# Patient Record
Sex: MALE | Race: BLACK OR AFRICAN AMERICAN | NOT HISPANIC OR LATINO | Employment: FULL TIME | URBAN - METROPOLITAN AREA
[De-identification: names, ages, dates, MRNs, and addresses within clinical notes are randomized per-mention and may not be internally consistent; named-entity substitution may affect disease eponyms.]

---

## 2017-02-01 ENCOUNTER — TRANSCRIBE ORDERS (OUTPATIENT)
Dept: URGENT CARE | Facility: CLINIC | Age: 37
End: 2017-02-01

## 2017-02-01 DIAGNOSIS — S39.012A LOW BACK STRAIN, INITIAL ENCOUNTER: Primary | ICD-10-CM

## 2017-02-03 ENCOUNTER — TREATMENT (OUTPATIENT)
Dept: PHYSICAL THERAPY | Facility: CLINIC | Age: 37
End: 2017-02-03

## 2017-02-03 DIAGNOSIS — S39.012D LUMBAR STRAIN, SUBSEQUENT ENCOUNTER: Primary | ICD-10-CM

## 2017-02-03 PROCEDURE — 97110 THERAPEUTIC EXERCISES: CPT | Performed by: PHYSICAL THERAPIST

## 2017-02-03 PROCEDURE — 97001 PR PHYS THERAPY EVALUATION: CPT | Performed by: PHYSICAL THERAPIST

## 2017-02-03 PROCEDURE — 97014 ELECTRIC STIMULATION THERAPY: CPT | Performed by: PHYSICAL THERAPIST

## 2017-02-03 NOTE — PROGRESS NOTES
Physical Therapy Initial Evaluation and Plan of Care    TIME  TIME     Subjective Evaluation    History of Present Illness  Date of onset: 2017  Mechanism of injury: Pulling a bertin down a ramp    Quality of life: excellent    Pain  Current pain ratin  At worst pain ratin  Location: left low back  Quality: burning  Relieving factors: rest  Aggravating factors: movement  Progression: improved    Patient Goals  Patient goal: no pain           Objective     Postural Observations    Additional Postural Observation Details  Normal transitions    Tenderness     Additional Tenderness Details  TTP to left T12-L3 pvms    Active Range of Motion     Lumbar   Flexion: 96 degrees   Extension: 17 degrees   Left lateral flexion: 16 degrees   Right lateral flexion: 20 degrees     Tests     Lumbar     Left   Negative passive SLR.     Right   Negative passive SLR.     Additional Tests Details  + WESLEY on left for LBP, + Prone Press Up         Assessment & Plan     Assessment  Impairments: abnormal or restricted ROM, activity intolerance, lacks appropriate home exercise program and pain with function  Assessment details: Patient presents with pain, TTP, decreased AROM for lumbar spine and positive special testing which are limiting patient's ability to perform full job duties and ADL'S.  Prognosis details: STG's to be met by 2 weeks  1)  Independent with HEP  2)  Decrease pain by 50% or more  3)  AROM WNL for lumbar spine  4)  Min to no TTP to lumbar spine    LTG's to be met by 4 weeks  1)  Independent with HEP progression  2)  Decrease pain by 75% or more  3)  No TTP to lumbar spine  4)  Negative special testing  5)  Patient to push/pull up to 100 lbs  6)  Patient to lift floor to waist up to 50 lbs    Goals  no pain    Plan  Therapy options: will be seen for skilled physical therapy services  Planned modality interventions: TENS, ultrasound and iontophoresis  Planned therapy interventions: home exercise  program, stretching, strengthening, therapeutic activities, abdominal trunk stabilization, body mechanics training and flexibility  Frequency: 3x week  Duration in weeks: 4        Manual Therapy:    0     mins  96538;  Therapeutic Exercise:    10     mins  83085;     Neuromuscular Kristen:    0    mins  51194;    Therapeutic Activity:     0     mins  50694;     Gait Trainin     mins  97743;     Ultrasound:     0     mins  51450;    Work Hardening           0      mins 71473  Iontophoresis               0   mins 45510    Timed Treatment:   10   mins   Total Treatment:     25   mins    PT SIGNATURE: Antonio Rea, PT   DATE TREATMENT INITIATED: 2/3/2017    Initial Certification  Certification Period: 2017  I certify that the therapy services are furnished while this patient is under my care.  The services outlined above are required by this patient, and will be reviewed every 90 days.     PHYSICIAN: SREEDHAR Perla      DATE:     Please sign and return via fax to [phone].. Thank you, [facility].

## 2017-02-06 ENCOUNTER — TREATMENT (OUTPATIENT)
Dept: PHYSICAL THERAPY | Facility: CLINIC | Age: 37
End: 2017-02-06

## 2017-02-06 DIAGNOSIS — S39.012D LUMBAR STRAIN, SUBSEQUENT ENCOUNTER: Primary | ICD-10-CM

## 2017-02-06 PROCEDURE — 97110 THERAPEUTIC EXERCISES: CPT | Performed by: PHYSICAL THERAPIST

## 2017-02-06 PROCEDURE — 97014 ELECTRIC STIMULATION THERAPY: CPT | Performed by: PHYSICAL THERAPIST

## 2017-02-06 NOTE — PROGRESS NOTES
Physical Therapy Daily Progress Note    Time In 126  Time Out 207    Emile Bloom reports: that the back feels all right, rates the pain at 4/10.    Subjective     Objective   See Exercise, Manual, and Modality Logs for complete treatment.       Assessment/Plan  Patient tolerated the initiation of core stabilization exercises without c/o pain or discomfort in the lumbar spine.                   Manual Therapy:    0     mins  38325;  Therapeutic Exercise:    23     mins  88524;     Neuromuscular Kristen:    0    mins  92923;    Therapeutic Activity:     0     mins  14287;     Gait Trainin     mins  17021;     Ultrasound:     0     mins  24535;    Work Hardening           0      mins 49138  Iontophoresis               0   mins 52103    Timed Treatment:   23   mins   Total Treatment:     41   mins    Antonio Rea, PT  Physical Therapist

## 2017-02-08 ENCOUNTER — TRANSCRIBE ORDERS (OUTPATIENT)
Dept: URGENT CARE | Facility: CLINIC | Age: 37
End: 2017-02-08

## 2017-02-08 ENCOUNTER — TREATMENT (OUTPATIENT)
Dept: PHYSICAL THERAPY | Facility: CLINIC | Age: 37
End: 2017-02-08

## 2017-02-08 DIAGNOSIS — S39.012D LUMBAR STRAIN, SUBSEQUENT ENCOUNTER: Primary | ICD-10-CM

## 2017-02-08 DIAGNOSIS — S39.012A LOW BACK STRAIN, INITIAL ENCOUNTER: Primary | ICD-10-CM

## 2017-02-08 PROCEDURE — 97014 ELECTRIC STIMULATION THERAPY: CPT | Performed by: PHYSICAL THERAPIST

## 2017-02-08 PROCEDURE — 97110 THERAPEUTIC EXERCISES: CPT | Performed by: PHYSICAL THERAPIST

## 2017-02-08 NOTE — PROGRESS NOTES
Re-Assessment / Re-Certification    Time In 133     Time Out 217    Patient: Emile Bloom   : 1980  Diagnosis/ICD-10 Code:  Lumbar strain, subsequent encounter [S39.012D]  Referring practitioner: SREEDHAR Perla  Date of Initial Visit: 2017  Today's Date: 2017  Patient seen for 3 sessions      Subjective:   Emile Bloom reports: that the back feels about the same, rates the pain at 4/10.    Clinical Progress: unchanged  Home Program Compliance: Yes  Treatment has included: therapeutic exercise, electrical stimulation and moist heat    Subjective Evaluation    Pain  Current pain ratin         Objective     Tenderness     Additional Tenderness Details  TTP to left lumbar pvms and QL.    Active Range of Motion     Lumbar   Flexion: 109 degrees   Extension: 23 degrees   Left lateral flexion: 22 degrees   Right lateral flexion: 19 degrees     Tests     Lumbar     Left   Negative passive SLR.     Right   Negative passive SLR.     Additional Tests Details  - Prone Press Up  + WESLEY on left for LBP     Assessment/Plan  Patient has tolerated PT well thus far.  Minimal improvements have been made.  Deficits persist with regard to pain, TTP, SB AROM, special testing and function.      PT Signature: Antonio Rea, PT      Based upon review of the patient's progress and continued therapy plan, it is my medical opinion that Emile Bloom should continue physical therapy treatment at [facility]  [address] [place] [id number].    Signature: __________________________________  SREEDHAR Perla    Manual Therapy:    0     mins  78719;  Therapeutic Exercise:    29     mins  34149;     Neuromuscular Kristen:    0    mins  72170;    Therapeutic Activity:     0     mins  35677;     Gait Trainin     mins  00855;     Ultrasound:     0     mins  05233;    Work Hardening           0      mins 29547  Iontophoresis               0   mins 67841    Timed  Treatment:   29   mins   Total Treatment:     44   mins

## 2017-02-10 ENCOUNTER — TREATMENT (OUTPATIENT)
Dept: PHYSICAL THERAPY | Facility: CLINIC | Age: 37
End: 2017-02-10

## 2017-02-10 DIAGNOSIS — S39.012D LUMBAR STRAIN, SUBSEQUENT ENCOUNTER: Primary | ICD-10-CM

## 2017-02-10 PROCEDURE — 97110 THERAPEUTIC EXERCISES: CPT | Performed by: PHYSICAL THERAPIST

## 2017-02-10 PROCEDURE — 97014 ELECTRIC STIMULATION THERAPY: CPT | Performed by: PHYSICAL THERAPIST

## 2017-02-10 NOTE — PROGRESS NOTES
Physical Therapy Daily Progress Note    Time In 120  Time Out 205    Emile Bloom reports: pain in the back rated at 3/10, better overall.    Subjective     Objective   See Exercise, Manual, and Modality Logs for complete treatment.       Assessment/Plan  Good tolerance to the core stabilization progressions, patient reported no pain in the lumbar spine with the routine.  Subjective reports continue to improve.                   Manual Therapy:    0     mins  16983;  Therapeutic Exercise:    26     mins  84634;     Neuromuscular Kristen:    0    mins  40204;    Therapeutic Activity:     0     mins  53406;     Gait Trainin     mins  33963;     Ultrasound:     0     mins  96196;    Work Hardening           0      mins 02784  Iontophoresis               0   mins 45967    Timed Treatment:   26   mins   Total Treatment:     45   mins    Antonio Rea, PT  Physical Therapist

## 2017-02-15 ENCOUNTER — TREATMENT (OUTPATIENT)
Dept: PHYSICAL THERAPY | Facility: CLINIC | Age: 37
End: 2017-02-15

## 2017-02-15 DIAGNOSIS — S39.012D LUMBAR STRAIN, SUBSEQUENT ENCOUNTER: Primary | ICD-10-CM

## 2017-02-15 PROCEDURE — 97110 THERAPEUTIC EXERCISES: CPT | Performed by: PHYSICAL THERAPIST

## 2017-02-15 PROCEDURE — 97014 ELECTRIC STIMULATION THERAPY: CPT | Performed by: PHYSICAL THERAPIST

## 2017-02-15 NOTE — PROGRESS NOTES
Physical Therapy Daily Progress Note    Time In 243  Time Out 325    Emile Bloom reports: that the back feels good, denies pain but does report some stiffness.    Subjective     Objective   See Exercise, Manual, and Modality Logs for complete treatment.       Assessment/Plan  Subjective reports continue to be much improved.  Patient performed the routine without c/o pain in the lumbar spine.  Anticipate D/C at next MD f/u.    Plan  Assess at next treatment for MD f/u.                 Manual Therapy:    0     mins  67403;  Therapeutic Exercise:    25     mins  73151;     Neuromuscular Kristen:    0    mins  01350;    Therapeutic Activity:     0     mins  89926;     Gait Trainin     mins  62804;     Ultrasound:     0     mins  24454;    Work Hardening           0      mins 11144  Iontophoresis               0   mins 34483    Timed Treatment:   25   mins   Total Treatment:     42   mins    Antonio Rea, PT  Physical Therapist

## 2017-02-17 ENCOUNTER — TREATMENT (OUTPATIENT)
Dept: PHYSICAL THERAPY | Facility: CLINIC | Age: 37
End: 2017-02-17

## 2017-02-17 DIAGNOSIS — S39.012D LUMBAR STRAIN, SUBSEQUENT ENCOUNTER: Primary | ICD-10-CM

## 2017-02-17 PROCEDURE — 97110 THERAPEUTIC EXERCISES: CPT | Performed by: PHYSICAL THERAPIST

## 2017-02-17 NOTE — PROGRESS NOTES
Re-Assessment / Re-Certification    Time In 149     Time Out 158    Patient: Emile Bloom   : 1980  Diagnosis/ICD-10 Code:  Lumbar strain, subsequent encounter [S39.012D]  Referring practitioner: SREEDHAR Perla  Date of Initial Visit: 2017  Today's Date: 2017  Patient seen for 6 sessions      Subjective:   Emile Bloom reports: that the back feels good, denies pain.    Clinical Progress: improved  Home Program Compliance: Yes  Treatment has included: therapeutic exercise, electrical stimulation and moist heat    Subjective   Objective     Tenderness     Additional Tenderness Details  No TTP to left lumbar spine as previous.    Active Range of Motion     Lumbar   Normal active range of motion    Tests     Lumbar     Left   Negative passive SLR.     Right   Negative passive SLR.     Additional Tests Details  - WESLEY  - Prone Press Up     Patient is able to lift floor to waist up to 55 lbs without c/o.  Patient is able to push/pull up to 138 lbs without c/o.    Assessment/Plan      PT Signature: Antonio Rea, PT      Based upon review of the patient's progress and continued therapy plan, it is my medical opinion that Emile Bloom should continue physical therapy treatment at [facility]  [address] [place] [phone].    Signature: __________________________________  SREEDHAR Perla    Manual Therapy:    0     mins  85603;  Therapeutic Exercise:     9    mins  18416;     Neuromuscular Kristen:    0    mins  69656;    Therapeutic Activity:     0     mins  09564;     Gait Trainin     mins  24798;     Ultrasound:     0     mins  90223;    Work Hardening           0      mins 47497  Iontophoresis               0   mins 89391    Patient late therefore only reassessed for MD appointment.    Timed Treatment:   9   mins   Total Treatment:     9   mins

## 2017-03-09 ENCOUNTER — DOCUMENTATION (OUTPATIENT)
Dept: PHYSICAL THERAPY | Facility: CLINIC | Age: 37
End: 2017-03-09

## 2017-03-09 NOTE — PROGRESS NOTES
Discharge Summary  Discharge Summary from Physical Therapy Report      Dates  PT visit: 2/3 to 2/17/17  Number of Visits: 6     Discharge Status of Patient: See MD Note dated 2/17/17    Goals: Partially Met    Discharge Plan: Continue with current home exercise program as instructed    Comments Patient released by MD.    Date of Discharge 3/9/17        Antonio Rea, PT  Physical Therapist

## 2022-10-27 ENCOUNTER — TRANSCRIBE ORDERS (OUTPATIENT)
Dept: OCCUPATIONAL THERAPY | Facility: CLINIC | Age: 42
End: 2022-10-27

## 2022-10-27 DIAGNOSIS — M72.2 PLANTAR FASCIITIS OF RIGHT FOOT: Primary | ICD-10-CM

## 2022-10-28 ENCOUNTER — TREATMENT (OUTPATIENT)
Dept: PHYSICAL THERAPY | Facility: CLINIC | Age: 42
End: 2022-10-28

## 2022-10-28 DIAGNOSIS — M72.2 PLANTAR FASCIITIS OF RIGHT FOOT: Primary | ICD-10-CM

## 2022-10-28 PROCEDURE — 97033 APP MDLTY 1+IONTPHRSIS EA 15: CPT | Performed by: PHYSICAL THERAPIST

## 2022-10-28 PROCEDURE — 97162 PT EVAL MOD COMPLEX 30 MIN: CPT | Performed by: PHYSICAL THERAPIST

## 2022-10-28 PROCEDURE — 97140 MANUAL THERAPY 1/> REGIONS: CPT | Performed by: PHYSICAL THERAPIST

## 2022-10-28 PROCEDURE — 97530 THERAPEUTIC ACTIVITIES: CPT | Performed by: PHYSICAL THERAPIST

## 2022-10-28 NOTE — PROGRESS NOTES
Physical Therapy Initial Evaluation and Plan of Care    Patient Name: Emile Bloom          :  1980  Referring Physician: Keyona Weaver APRN  Diagnosis: Plantar fasciitis of right foot [M72.2]    Date of Evaluation: 10/28/2022  ______________________________________________________________________    Subjective Evaluation    History of Present Illness  Date of onset: 10/26/2022  Mechanism of injury: Walking up /down ramp;  Felt pain while going down ramp towards the end of his work day -       Patient Occupation: Deliver alcohol Pain  Current pain ratin  At worst pain ratin  Location: Volar heel and medial arch (R)   Quality: Throbs / Ache; Hurting.  Alleviating factors: Rest - NWB.  Exacerbated by: WB-ing RLE, walking, stairs, ramps, etc.  Symptom course: Mild improvement, but minmal     Diagnostic Tests  X-ray: normal    Treatments  Current treatment: medication  Patient Goals  Patient/family treatment goals: Pain alleviation; Normal gait, endurance, mobility to allow ADLs and normal job.         ___________________________________________________  Objective          Observations     Additional Ankle/Foot Observation Details  Standing w/ Wt on ball of (R) foot w/ WS to (L)  Antalgic gait w/ no heel strike (R) and minimal WB-ing RLE  Pes cavus (B)   Swelling medial arch / medial prox calcaneus -     Tenderness     Additional Tenderness Details  Severely tender volar calcaneus/PF, medial arch (R)    Active Range of Motion     Additional Active Range of Motion Details  WFL (R) Ankle w/ pulling discomfort volar foot/medial arch w/ DF -     Strength/Myotome Testing     Additional Strength Details  WNL w/ manual resisted ankle DF/PF, INV/EV      Tests     Additional Tests Details  (-) Ankle ligamentous testing - and ant/post drawer -         See Treatment Flow sheet for Exercises, Manual therapy, and modalities. HEP and discussed frequency of stretching, icing, etc  FUNCTIONAL ACTIVITIES:    · TAPING / BRACING: K-tape to Unload medial arch w/ Leukotape over x 2 layers (R) foot   · Jt protection, ADL modification; Posture and     ___________________________________________________  Assessment & Plan     Assessment    Assessment details: 43 yo male - (R) PF strain / Plantar fasciitis     PROBLEMS: Pain; Abnormal gait; Intolerance to ADLs and normal job -   PROGNOSIS: Good    GOALS:   SHORT TERM GOALS: 2 weeks:  1) HEP Initiated; 2) Pain decreased 50%:   3) Improved gait with increased heel contact / improved functional ability with taping     LONG TERM GOALS: 4 weeks (or at time of DISCHARGE): 1) (I) HEP; 2) AROM WFL and pain free; 3) Strength / mobility to be able to perform all ADL's and job-related activities w/o restrictions; 4) Gait / endurance to allow normal ADLs and job duties -       Plan  Planned therapy interventions: flexibility, manual therapy, neuromuscular re-education, strengthening, stretching, therapeutic activities and soft tissue mobilization  Frequency: 3x week  Duration in weeks: 4  Treatment plan discussed with: patient      ___________________________________________________  Manual Therapy:    08     mins  44977;  Therapeutic Exercise:    05     mins  47372;     Neuromuscular Kristen:        mins  12135;    Therapeutic Activity:     15     mins  71251;   Self Care:                           mins  20032  Ultrasound:     08     mins  02803;  Iontophoresis:     20    mins  36711;    Electrical Stimulation:         mins  82798 ( );  Mechanical Traction:          mins  42577  Dry Needling          mins self-pay    Eval:   15   mins    Timed Treatment:   56   mins                  Total Treatment:     71   mins    PT SIGNATURE:     Cheo Gentile, PT  DATE TREATMENT INITIATED: 10/28/2022  ___________________________________________________  Initial Certification  Certification Period: 1/26/2023  I certify that the therapy services are furnished while this patient is under  my care.  The services outlined above are required by this patient, and will be reviewed every 90 days.     PHYSICIAN: ________________________________  DATE: ______  Keyona Weaver APRN        Please sign and return via fax to [phone].. Thank you, [facility].  ______________________________________________________________________  [address] [place] [address]  Phone: [phone] Fax: [phone]

## 2022-11-01 NOTE — PROGRESS NOTES
Physical Therapy Daily Treatment Note    Visit Diagnoses:    ICD-10-CM ICD-9-CM   1. Plantar fasciitis of right foot  M72.2 728.71       VISIT#: Darlin Bloom reports: His foot feels better than it did the other day. He can actually stand flat footed. Now the back of his heel is better but the arch is hurting more. Stated he got some insoles and that the tape was very helpful. Everyday it is getting a little better.   Current Pain Level:    6/10; Worst:   8/10; Best:  0/10  Location Of Pain: Volar heel and medial arch (R)  Quality of Pain: Throbs / Ache; Hurting  Response to Previous Session: good. No issues and responded well  Functional Deficits/Irritating Factors: WB-ing RLE, walking, stairs, ramps, etc  Progression: improving  Compliance with HEP Reported: yes      Objective  Presents: Antalgic gait w/ no heel strike (R) and minimal WB-ing RLE, Pes cavus (B)   Increased sets/reps of:  none   Increased resistance on:  none  Added to Program: SciFit, Heel Toe Raises,  Toe Scrunches, PF and standing calf stretches  Therapeutic Activity: TAPING / BRACING: K-tape to Unload medial arch w/ Leukotape over x 2 layers (R) foot       See Exercise, Manual, and Modality Logs for complete treatment.     Patient Education: Pt was educated on exercise biomechanical correctness, intensity, and speed.     Assessment:  Pt making progress with decreased pain and improved gait and tolerance to wt bearing activities. Pt will continue to benefit from skilled PT interventions to address current functional deficits and impairments.       Plan: Progress to/Continue with current program. Progress as pain diminishes      Timed:  Manual Therapy:            10_    mins  87116;  Ultrasound:                     0/10      mins  93248;   Therapeutic Exercise:    20/25     mins  47282;     Neuromuscular Kristen:   0     mins  82612;    Therapeutic Activity:      8     mins  47145;      Iontophoresis              _20__   mins  Dry Needling                _0____   mins         Untimed:  Work Conditioning: __0__ mins 13266  Electrical Stimulation:    0    mins  73354 ( );  Mechanical Traction:       0        mins  83065;   Paraffin                       __0___  mins   Ice/Heat: __0__ mins      Timed Treatment:   73   mins   Total Treatment:     80   mins          Jaye Charlton PTA  [place] License # [id number]  Physical Therapist Assistant

## 2022-11-02 ENCOUNTER — TREATMENT (OUTPATIENT)
Dept: PHYSICAL THERAPY | Facility: CLINIC | Age: 42
End: 2022-11-02

## 2022-11-02 DIAGNOSIS — M72.2 PLANTAR FASCIITIS OF RIGHT FOOT: Primary | ICD-10-CM

## 2022-11-02 PROCEDURE — 97033 APP MDLTY 1+IONTPHRSIS EA 15: CPT | Performed by: PHYSICAL THERAPIST

## 2022-11-02 PROCEDURE — 97530 THERAPEUTIC ACTIVITIES: CPT | Performed by: PHYSICAL THERAPIST

## 2022-11-02 PROCEDURE — 97110 THERAPEUTIC EXERCISES: CPT | Performed by: PHYSICAL THERAPIST

## 2022-11-02 PROCEDURE — 97140 MANUAL THERAPY 1/> REGIONS: CPT | Performed by: PHYSICAL THERAPIST

## 2022-11-03 ENCOUNTER — TREATMENT (OUTPATIENT)
Dept: PHYSICAL THERAPY | Facility: CLINIC | Age: 42
End: 2022-11-03

## 2022-11-03 DIAGNOSIS — M72.2 PLANTAR FASCIITIS OF RIGHT FOOT: Primary | ICD-10-CM

## 2022-11-03 PROCEDURE — 97110 THERAPEUTIC EXERCISES: CPT | Performed by: PHYSICAL THERAPIST

## 2022-11-03 PROCEDURE — 97140 MANUAL THERAPY 1/> REGIONS: CPT | Performed by: PHYSICAL THERAPIST

## 2022-11-03 PROCEDURE — 97530 THERAPEUTIC ACTIVITIES: CPT | Performed by: PHYSICAL THERAPIST

## 2022-11-03 PROCEDURE — 97033 APP MDLTY 1+IONTPHRSIS EA 15: CPT | Performed by: PHYSICAL THERAPIST

## 2022-11-03 NOTE — PROGRESS NOTES
------------------------------------------------------------------------------------------------------   MD PROGRESS NOTE    Patient: Emile Bloom        : 1980  Diagnosis/ICD-10 Code:  Plantar fasciitis of right foot [M72.2]  Referring practitioner: SHALONDA Winters  Date of Initial Visit: 10/28/2022                  Today's Date: 11/3/2022  _________________________________________________________________    Thank you for the referral of Mr. Bloom to [facility].  Mr. Bloom has attended 3 PT sessions and their treatment has consisted of: modalities prn, manual therapy, therapeutic exercise, taping, patient education, and HEP.     Subjective   Emile Bloom reports: taping helpful in decreasing his pain ; 40% - Still very painful to heel strike especially bare foot -   Pain volar heel and prox/medial arch (R) foot -   Tape helps a lot - able to walk better -  ___________________________________________________________________  Objective              OBSERVATION: Improved, but antalgic gait RLE with minimal heel strike- Swelling volar/med heel/arch (R)              PALPATION: Tender prox PF and med/volar calc and med arch (R)        AROM: WFNL   STRENGTH: WNL ankle   DTR's/SENSATION: Grossly intact LEs   SPECIAL TESTS: (-) ankle tests - (+) Plantar fasciitis (R)   ACTIVITY TOLERANCE: Improved, but limited tolerance to ADLs and normal job      See Exercise, Manual, and Modality Logs for complete treatment.      Functional / Therapeutic Activities:    · TAPING / BRACING: K-tape to Unload medial arch w/ Leukotape over x 2 layers (R) foot   · FUNCTIONAL ASSESSMENT  · SEE EXERCISE FLOW SHEET -   · Jt protection, ADL modification; Posture and    ___________________________________________________________________   Assessment/Plan  41 yo male - (R) PF strain / Plantar fasciitis   Improved overall - taping helpful   Mr. Bloom would benefit from continued Physical Therapy.      P: Recommend continued Physical Therapy to allow a full and safe return to ADL's and normal job duties.    Please advise after your exam.    Thank you again for this referral of Mr. Bloom to [facility].    PT Signature: ______________________________   Cheo Gentile, PT  ____________________________________________________________________  Manual Therapy:    08     mins  80985;  Therapeutic Exercise:    15     mins  49725;     Neuromuscular Kristen:        mins  25972;    Therapeutic Activity:     23     mins  11077;   Self Care:                           mins  84717  Ultrasound:     06     mins  58733;  Iontophoresis:     20     mins  30731;    Electrical Stimulation:         mins  50997 ( );    Timed Treatment:   72   mins                  Total Treatment:     72   mins    ______________________________________________________________________  [address] [place] [address]  Phone: [phone] Fax: [phone]

## 2022-11-06 NOTE — PROGRESS NOTES
Physical Therapy Daily Treatment Note    Visit Diagnoses:    ICD-10-CM ICD-9-CM   1. Plantar fasciitis of right foot  M72.2 728.71       VISIT#: 4      Emile Bloom reports: His foot is getting better. He can walk better on it.   Current Pain Level:    0/10; Worst:   4-5/10; Best:  0/10  Location Of Pain: Volar heel and medial arch (R)  Quality of Pain: Throbs / Ache; Hurting  Response to Previous Session: good. No issues and responded well  Functional Deficits/Irritating Factors: WB-ing RLE, walking, stairs, ramps, etc  Progression: improving  Compliance with HEP Reported: yes    Objective  Presents: antalgic gait RLE with minimal heel strike- Swelling volar/med heel/arch (R), bruising on bottom of foot  Increased sets/reps of:  none   Increased resistance on:  none  Added to Program: none  Pt was 20 min late for his appointment    Therapeutic Activity: TAPING /BRACING: K-tape to Unload medial arch w/ Leukotape over x 2 layers (R) foot       See Exercise, Manual, and Modality Logs for complete treatment.     Patient Education: Pt was educated on exercise biomechanical correctness, intensity, and speed.     Assessment:  Pt foot continues to improved functionally with increased walking time and distance. Pain has also decreased and less tenderness in the plantar fascia. Mild edematous knot still on medial arch.  Pt will continue to benefit from skilled PT interventions to address current functional deficits and impairments.       Plan: Progress to/Continue with current program.       Timed:  Manual Therapy:            8_    mins  51902;  Ultrasound:                     8      mins  80342;   Therapeutic Exercise:    20     mins  11498;     Neuromuscular Kristen:   0     mins  29638;    Therapeutic Activity:      0     mins  59793;      Iontophoresis              _20__   mins  Dry Needling               _0____   mins         Untimed:  Work Conditioning: __0__ mins 25052  Electrical Stimulation:    0    mins  29806 (LALO  );  Mechanical Traction:       0        mins  38677;   Paraffin                       __0___  mins   Ice/Heat: __0__ mins      Timed Treatment:   56   mins   Total Treatment:     56   mins          ANGELIQUE Jaimes License # [id number]  Physical Therapist Assistant

## 2022-11-07 ENCOUNTER — TREATMENT (OUTPATIENT)
Dept: PHYSICAL THERAPY | Facility: CLINIC | Age: 42
End: 2022-11-07

## 2022-11-07 DIAGNOSIS — M72.2 PLANTAR FASCIITIS OF RIGHT FOOT: Primary | ICD-10-CM

## 2022-11-07 PROCEDURE — 97035 APP MDLTY 1+ULTRASOUND EA 15: CPT | Performed by: PHYSICAL THERAPIST

## 2022-11-07 PROCEDURE — 97033 APP MDLTY 1+IONTPHRSIS EA 15: CPT | Performed by: PHYSICAL THERAPIST

## 2022-11-07 PROCEDURE — 97110 THERAPEUTIC EXERCISES: CPT | Performed by: PHYSICAL THERAPIST

## 2022-11-07 PROCEDURE — 97140 MANUAL THERAPY 1/> REGIONS: CPT | Performed by: PHYSICAL THERAPIST

## 2022-11-08 NOTE — PROGRESS NOTES
MD Letter  Patient: Emile Bloom   : 1980    Date of Initial Visit: Type: THERAPY  Noted: 10/28/2022  Visit Diagnoses:     ICD-10-CM ICD-9-CM   1. Plantar fasciitis of right foot  M72.2 728.71     Today's Date: 2022  Patient seen for 5 sessions    Treatment has included: therapeutic exercise, manual therapy, ultrasound, iontophoresis and Taping    Subjective Pt reports a 65% improvement since starting PT. He is still having pain with walking. Ramps and stairs still bother his foot. It is getting better daily.   Current Pain Level:  0/10; Worst Pain level: 4/10; Best Pain Level: 0/10  Location of Pain: Volar heel and medial arch (R)  Functional Deficits/Impairments:  prolonged walking, stairs, ramps, etc      Objective   AROM : WFL (R) Ankle w/ pulling discomfort volar foot/medial arch w/ DF   Strength : WNL w/ manual resisted ankle DF/PF, INV/EV  Palpation : Tender volar calcaneus/PF, medial arch (R)  Activity tolerance : improved but certain wt bearing positions continue to be painful.   T.A. KT taping to support R arch of foot    @PTASSESSMENT/PLAN@  Patient has demonstrated moderate improvement since the initiation of therapy.  The pain has decreaded.  The motion has improved.  The activity tolerances have increased.  I feel that the patient would benefit from additional physical therapy in order to return to prior level of function/work demand level.  If you have any questions concerning the care, please do not hesitate to contact me.              PT Signature: Jaye Charlton PTA        Electrical Stimulation: __0____ mins 07605/  Ultrasound:   0/8 mins 22114  Work Conditionin mins 76497  Iontophoresis: 20 mins 80732  Traction: 0 mins 45177  Manual Therapy:    8     mins  72280;  Therapeutic Exercise:    15     mins  38795;     Neuromuscular Kristen:    0    mins  62355;    Therapeutic Activity:     8     mins  15707;     Ice/Heat:  __0_ mins   Timed Treatment:   39   mins   Total  Treatment:     60   mins

## 2022-11-09 ENCOUNTER — TREATMENT (OUTPATIENT)
Dept: PHYSICAL THERAPY | Facility: CLINIC | Age: 42
End: 2022-11-09

## 2022-11-09 DIAGNOSIS — M72.2 PLANTAR FASCIITIS OF RIGHT FOOT: Primary | ICD-10-CM

## 2022-11-09 PROCEDURE — 97530 THERAPEUTIC ACTIVITIES: CPT | Performed by: PHYSICAL THERAPIST

## 2022-11-09 PROCEDURE — 97140 MANUAL THERAPY 1/> REGIONS: CPT | Performed by: PHYSICAL THERAPIST

## 2022-11-09 PROCEDURE — 97033 APP MDLTY 1+IONTPHRSIS EA 15: CPT | Performed by: PHYSICAL THERAPIST

## 2022-11-09 PROCEDURE — 97110 THERAPEUTIC EXERCISES: CPT | Performed by: PHYSICAL THERAPIST

## 2022-11-11 ENCOUNTER — TREATMENT (OUTPATIENT)
Dept: PHYSICAL THERAPY | Facility: CLINIC | Age: 42
End: 2022-11-11

## 2022-11-11 DIAGNOSIS — M72.2 PLANTAR FASCIITIS OF RIGHT FOOT: Primary | ICD-10-CM

## 2022-11-11 PROCEDURE — 97530 THERAPEUTIC ACTIVITIES: CPT | Performed by: PHYSICAL THERAPIST

## 2022-11-11 PROCEDURE — 97110 THERAPEUTIC EXERCISES: CPT | Performed by: PHYSICAL THERAPIST

## 2022-11-11 NOTE — PROGRESS NOTES
Physical Therapy Daily Note    Patient Name: Emile Bloom         :  1980  Referring Physician: Keyona Weaver APRN  Treatment Date: 2022  Date of Initial Visit: No linked episodes    Visit Diagnoses:    ICD-10-CM ICD-9-CM   1. Plantar fasciitis of right foot  M72.2 728.71       _____________________________________________________    Subjective   Emile Bloom reports: continued improvement - decreased pain - able to walk more normally - Tape very effective -     Objective   Pt ambulating w/ increased WB-ing RLE w/ improved heel strike and push-off - even better when taped -   Residual tenderness volar calc, med distal calc/prox medial arch (R) foot - smaller area of swelling -     See Exercise, Manual, and Modality Logs for complete treatment.     Functional / Therapeutic Activities:    • TAPING / BRACING: K-tape to Unload PF x 3 strips and support medial arch  w/ Leukotape over x 2 layers (R) foot   • SEE EXERCISE FLOW SHEET -   • Jt protection, ADL modification; Posture and    ___________________________________________________________________   Assessment/Plan  43 yo male - (R) PF strain / Plantar fasciitis   Improved overall - taping helpful   Mr. Bloom would benefit from continued Physical Therapy.     Progress strengthening /stabilization /functional activity including work simulation       _________________________________________________    Manual Therapy:                 mins  11807;  Therapeutic Exercise:    23     mins  26227;     Neuromuscular Kristen:        mins  87967;    Therapeutic Activity:      30    mins  19636;   Self Care:                           mins  14724  Ultrasound:                          mins  88373;  Iontophoresis:                     mins  15985;    Electrical Stimulation:         mins  51540 ( );     Timed Treatment:   53  mins                  Total Treatment:     53   mins      Cheo Gentile, PT  Physical Therapist  Lic # [id number]

## 2022-11-14 ENCOUNTER — TELEPHONE (OUTPATIENT)
Dept: PHYSICAL THERAPY | Facility: CLINIC | Age: 42
End: 2022-11-14

## 2022-11-16 ENCOUNTER — TREATMENT (OUTPATIENT)
Dept: PHYSICAL THERAPY | Facility: CLINIC | Age: 42
End: 2022-11-16

## 2022-11-16 DIAGNOSIS — M72.2 PLANTAR FASCIITIS OF RIGHT FOOT: Primary | ICD-10-CM

## 2022-11-16 PROCEDURE — 97530 THERAPEUTIC ACTIVITIES: CPT | Performed by: PHYSICAL THERAPIST

## 2022-11-16 PROCEDURE — 97110 THERAPEUTIC EXERCISES: CPT | Performed by: PHYSICAL THERAPIST

## 2022-11-16 NOTE — PROGRESS NOTES
------------------------------------------------------------------------------------------------------   MD PROGRESS NOTE     Patient: Emile Bloom        : 1980  Diagnosis/ICD-10 Code:  Plantar fasciitis of right foot [M72.2]  Referring practitioner: SHALONDA Winters  Date of Initial Visit: 10/28/2022                  Today's Date: 2022  _________________________________________________________________     Thank you for the referral of Mr. Bloom to [facility].  Mr. Bloom has attended 7 PT sessions and their treatment has consisted of: modalities prn, manual therapy, therapeutic exercise, taping, wrork simulation, patient education, and HEP.      Subjective   Emile Bloom reports: continued improvement -taping helpful -  ;notes 75-80% improvement- Remaining pain now under heel and more intermittent - noted pain when he attempted a light jog - Tape helps a lot -   ___________________________________________________________________  Objective              OBSERVATION: Improved gait w/ increased WB-ing, heel strike, stride, pace, etc. Much less swelling volar/med heel/arch (R)              PALPATION: Tender volar calc / proximal plantar fascia (R)              AROM: WFNL              STRENGTH: WNL ankle              DTR's/SENSATION: Grossly intact LEs              SPECIAL TESTS: (-) ankle tests -               ACTIVITY TOLERANCE: Improved with ADLs - Limited w/ aggressive pushing off activities -          See Exercise, Manual, and Modality Logs for complete treatment.      Functional / Therapeutic Activities:    • TAPING / BRACING: K-tape to Unload medial arch w/ Leukotape over x 2 layers (R) foot   • FUNCTIONAL ASSESSMENT  • SEE EXERCISE FLOW SHEET -   • Jt protection, ADL modification; Posture and    ___________________________________________________________________   Assessment/Plan  41 yo male - (R) PF strain / Plantar fasciitis   Improved w/ decreased pain  and improved gait and functional ability - Residual pain w/ aggressive pushing off activities  - taping helpful   Mr. Bloom would benefit from continued Physical Therapy.      P: Recommend continued Physical Therapy to allow a full and safe return to ADL's and normal very physical job duties.    Please advise after your exam.     Thank you again for this referral of Mr. Bloom to [facility].     PT Signature: ______________________________   Cheo Gentile, PT     ______________________________________________________________________  [address] [place] [address]  Phone: [phone]                 Fax: [phone]

## 2022-11-18 ENCOUNTER — TREATMENT (OUTPATIENT)
Dept: PHYSICAL THERAPY | Facility: CLINIC | Age: 42
End: 2022-11-18

## 2022-11-18 DIAGNOSIS — M72.2 PLANTAR FASCIITIS OF RIGHT FOOT: Primary | ICD-10-CM

## 2022-11-18 PROCEDURE — 97112 NEUROMUSCULAR REEDUCATION: CPT | Performed by: PHYSICAL THERAPIST

## 2022-11-18 PROCEDURE — 97110 THERAPEUTIC EXERCISES: CPT | Performed by: PHYSICAL THERAPIST

## 2022-11-18 PROCEDURE — 97530 THERAPEUTIC ACTIVITIES: CPT | Performed by: PHYSICAL THERAPIST

## 2022-11-18 NOTE — PROGRESS NOTES
Physical Therapy Daily Note    Patient Name: Emile Bloom         :  1980  Referring Physician: Keyona Weaver APRN  Treatment Date: 2022  Date of Initial Visit: Type: THERAPY  Noted: 10/28/2022    Visit Diagnoses:    ICD-10-CM ICD-9-CM   1. Plantar fasciitis of right foot  M72.2 728.71       _____________________________________________________    Subjective   Emile Bloom reports: only noting volar heel/achilles soreness -     Objective   Much improved gait - trial w/o tape -     See Exercise, Manual, and Modality Logs for complete treatment.     Functional / Therapeutic Activities:    • TAPING / BRACING: K-tape to Unload medial arch w/ Leukotape over x 2 layers (R) foot - HELD  • SEE EXERCISE FLOW SHEET -   • Jt protection, ADL modification; Posture and    ___________________________________________________________________   Assessment/Plan  41 yo male - (R) PF strain / Plantar fasciitis   Improved w/ decreased pain and improved gait and functional ability - Residual pain w/ aggressive pushing off activities  - taping helpful     Progress strengthening /stabilization /functional activity and work simulation activities -       _________________________________________________    Manual Therapy:                 mins  24669;  Therapeutic Exercise:    20    mins  47668;     Neuromuscular Kristen:   08     mins  04091;    Therapeutic Activity:     23      mins  98409;     Ultrasound:                          mins  59783;  Iontophoresis:                     mins  33348;    Electrical Stimulation:         mins  80002 ( );  Mechanical Traction:          mins  94391  Dry Needling                       mins self-pay     Timed Treatment:   53    mins                  Total Treatment:     53    mins        Cheo Gentile PT  Physical Therapist  Lic # [id number]